# Patient Record
Sex: MALE | Race: ASIAN | NOT HISPANIC OR LATINO | URBAN - METROPOLITAN AREA
[De-identification: names, ages, dates, MRNs, and addresses within clinical notes are randomized per-mention and may not be internally consistent; named-entity substitution may affect disease eponyms.]

---

## 2021-02-19 ENCOUNTER — OFFICE VISIT (OUTPATIENT)
Dept: URBAN - METROPOLITAN AREA CLINIC 78 | Facility: CLINIC | Age: 51
End: 2021-02-19

## 2021-03-16 ENCOUNTER — DASHBOARD ENCOUNTERS (OUTPATIENT)
Age: 51
End: 2021-03-16

## 2021-03-16 ENCOUNTER — OFFICE VISIT (OUTPATIENT)
Dept: URBAN - METROPOLITAN AREA CLINIC 78 | Facility: CLINIC | Age: 51
End: 2021-03-16
Payer: COMMERCIAL

## 2021-03-16 DIAGNOSIS — K51.90 ULCERATIVE COLITIS WITHOUT COMPLICATIONS, UNSPECIFIED LOCATION: ICD-10-CM

## 2021-03-16 PROCEDURE — 99203 OFFICE O/P NEW LOW 30 MIN: CPT | Performed by: INTERNAL MEDICINE

## 2021-03-16 RX ORDER — SODIUM, POTASSIUM,MAG SULFATES 17.5-3.13G
177 ML SOLUTION, RECONSTITUTED, ORAL ORAL
Qty: 354 ML | Refills: 0 | OUTPATIENT
Start: 2021-03-16 | End: 2021-03-17

## 2021-03-16 RX ORDER — METHYLPREDNISOLONE 4 MG/1
AS DIRECTED TABLET ORAL AS DIRECTED
Qty: 1 | Refills: 0 | OUTPATIENT
Start: 2021-03-16 | End: 2021-03-22

## 2021-03-16 RX ORDER — MESALAMINE 1.2 G/1
2 TABLETS TABLET, DELAYED RELEASE ORAL ONCE A DAY
Qty: 180 | Refills: 3 | OUTPATIENT
Start: 2021-03-16 | End: 2022-03-10

## 2021-03-16 NOTE — HPI-TODAY'S VISIT:
Pt was diagnosed with UC in 2000 at [place] He has been treated with MEsalamine  He used to get Prednisone if he had any flares He has been doing well for a long time  Since early January 2021 he has been having rectal bleeding  Last colon was more than 8 years ago

## 2021-04-11 PROBLEM — 64766004 ULCERATIVE COLITIS: Status: ACTIVE | Noted: 2021-03-16

## 2021-04-16 ENCOUNTER — OFFICE VISIT (OUTPATIENT)
Dept: URBAN - METROPOLITAN AREA SURGERY CENTER 15 | Facility: SURGERY CENTER | Age: 51
End: 2021-04-16

## 2021-05-21 ENCOUNTER — OFFICE VISIT (OUTPATIENT)
Dept: URBAN - METROPOLITAN AREA SURGERY CENTER 15 | Facility: SURGERY CENTER | Age: 51
End: 2021-05-21
Payer: COMMERCIAL

## 2021-05-21 ENCOUNTER — CLAIMS CREATED FROM THE CLAIM WINDOW (OUTPATIENT)
Dept: URBAN - METROPOLITAN AREA CLINIC 4 | Facility: CLINIC | Age: 51
End: 2021-05-21
Payer: COMMERCIAL

## 2021-05-21 DIAGNOSIS — K51.00 CHRONIC PANCOLONIC ULCERATIVE COLITIS: ICD-10-CM

## 2021-05-21 DIAGNOSIS — K63.89 STENOSIS OF ILEOCECAL VALVE: ICD-10-CM

## 2021-05-21 DIAGNOSIS — K51.80 OTHER ULCERATIVE COLITIS WITHOUT COMPLICATIONS: ICD-10-CM

## 2021-05-21 DIAGNOSIS — K51.919 ULCERATIVE COLITIS, UNSPECIFIED WITH UNSPECIFIED COMPLICATIONS: ICD-10-CM

## 2021-05-21 PROCEDURE — 88305 TISSUE EXAM BY PATHOLOGIST: CPT | Performed by: PATHOLOGY

## 2021-05-21 PROCEDURE — G8907 PT DOC NO EVENTS ON DISCHARG: HCPCS | Performed by: INTERNAL MEDICINE

## 2021-05-21 PROCEDURE — 45380 COLONOSCOPY AND BIOPSY: CPT | Performed by: INTERNAL MEDICINE
